# Patient Record
(demographics unavailable — no encounter records)

---

## 2025-03-06 NOTE — ASSESSMENT
[FreeTextEntry1] : I discussed the findings and options with Ms. Oliva GRIMES in detail.   Patient presents clinically well. No urinary/bladder complaints.  - Continue PFPT.  - Urine was sent for culture to r/o infection.   F/u 2 months after PFPT.  Patient expressed understanding.    The total amount of time I have personally spent preparing for this visit, reviewing the patient's test results, obtaining external history, ordering tests/medications, documenting clinical information, communicating with and counseling the patient/family and/or caregiver(s), reviewing old records, and spent face to face with the patient explaining the above was 35 minutes.

## 2025-03-06 NOTE — ADDENDUM
[FreeTextEntry1] : A portion of this note was written by [Fox Turner] on 03/05/2025 acting as a scribe for Dr. Perkins.   I have personally reviewed the chart and agree that the record accurately reflects my personal performance of the history, physical exam, assessment, and plan.

## 2025-03-06 NOTE — HISTORY OF PRESENT ILLNESS
[FreeTextEntry1] : 2025 --26 F  with hx UTI and meatal stricture.  Reports she has been doing well and feeling better s/p urethral dilation with cystoscopy.  She rates urethral pressure/discomfort of 2 (decreased from 8).  Reports recently started PFPT- she had her first session last week.    2024 -- 25 F  with hx UTI. Here today for cystoscopy.   Unremarkable urine studies 24.   2024 -- 25 F  with hx UTI, presenting to establish new care. Reports 2 confirmed UTIs over the past 12 months. Patient feels like she has UTI today. Reports "urethral pressure/discomfort" at the end of the stream- this started in 3/2024 and has never gone away since intial UTI.  Reports no prior UTIs before 3/2024.  Denies h/o pyelonephritis. Denies h/o STDs. Denies h/o sexual/pelvic trauma.   Sexually active (same partner, - circumcised). Reports has pain "only during insertion".  Pelvic exam utd.     PMH: denies  PSH: denies  FH: no known  malignancies  SocHx: non-smoker, social alcohol  Meds: kyleena IUD, multivitamins  Allergies: NKDA

## 2025-05-20 NOTE — HISTORY OF PRESENT ILLNESS
[FreeTextEntry1] : 2025 --  26 F  with hx UTI and meatal stricture.  Reports urethral pressure/discomfort of 2 (decreased from 8) with minimal bothersome. Patient doing well with PFPT to manage vaginismus.  She has not had any recent UTIs. Denies gross hematuria or dysuria.   2025 --26 F  with hx UTI and meatal stricture.  Reports she has been doing well and feeling better s/p urethral dilation with cystoscopy.  She rates urethral pressure/discomfort of 2 (decreased from 8).  Reports recently started PFPT- she had her first session last week.    2024 -- 25 F  with hx UTI. Here today for cystoscopy.   Unremarkable urine studies 24.   2024 -- 25 F  with hx UTI, presenting to establish new care. Reports 2 confirmed UTIs over the past 12 months. Patient feels like she has UTI today. Reports "urethral pressure/discomfort" at the end of the stream- this started in 3/2024 and has never gone away since intial UTI.  Reports no prior UTIs before 3/2024.  Denies h/o pyelonephritis. Denies h/o STDs. Denies h/o sexual/pelvic trauma.   Sexually active (same partner, - circumcised). Reports has pain "only during insertion".  Pelvic exam utd.     PMH: denies  PSH: denies  FH: no known  malignancies  SocHx: non-smoker, social alcohol  Meds: kyleena IUD, multivitamins  Allergies: NKDA

## 2025-05-20 NOTE — ASSESSMENT
[FreeTextEntry1] : I discussed the findings and options with Ms. Oliva GRIMES in detail.   Reports urethral pressure/discomfort of 2 (decreased from 8) with minimal bothersome.   - continue PFPT  - urine sent for UC. - consider possible urethral dilation in the OR if urethral symptoms become bothersome.    F/u 6-12 months. Patient expressed understanding.    The total amount of time I have personally spent preparing for this visit, reviewing the patient's test results, obtaining external history, ordering tests/medications, documenting clinical information, communicating with and counseling the patient/family and/or caregiver(s), reviewing old records, and spent face to face with the patient explaining the above was 35 minutes.

## 2025-05-20 NOTE — ADDENDUM
[FreeTextEntry1] : A portion of this note was written by [Fox Turner] on 05/14/2025 acting as a scribe for Dr. Perkins.   I have personally reviewed the chart and agree that the record accurately reflects my personal performance of the history, physical exam, assessment, and plan.